# Patient Record
Sex: MALE | Race: WHITE
[De-identification: names, ages, dates, MRNs, and addresses within clinical notes are randomized per-mention and may not be internally consistent; named-entity substitution may affect disease eponyms.]

---

## 2018-11-13 ENCOUNTER — HOSPITAL ENCOUNTER (OUTPATIENT)
Dept: HOSPITAL 61 - PCVCCLINIC | Age: 78
Discharge: HOME | End: 2018-11-13
Attending: INTERNAL MEDICINE
Payer: MEDICARE

## 2018-11-13 DIAGNOSIS — I73.9: ICD-10-CM

## 2018-11-13 DIAGNOSIS — R06.09: Primary | ICD-10-CM

## 2018-11-13 DIAGNOSIS — I25.10: ICD-10-CM

## 2018-11-13 DIAGNOSIS — E78.5: ICD-10-CM

## 2018-11-13 DIAGNOSIS — R42: ICD-10-CM

## 2018-11-13 DIAGNOSIS — E11.9: ICD-10-CM

## 2018-11-13 PROCEDURE — G0463 HOSPITAL OUTPT CLINIC VISIT: HCPCS

## 2018-11-13 PROCEDURE — 93005 ELECTROCARDIOGRAM TRACING: CPT

## 2018-11-30 ENCOUNTER — HOSPITAL ENCOUNTER (OUTPATIENT)
Dept: HOSPITAL 61 - PCVCIMAG | Age: 78
Discharge: HOME | End: 2018-11-30
Attending: INTERNAL MEDICINE
Payer: MEDICARE

## 2018-11-30 DIAGNOSIS — I25.10: ICD-10-CM

## 2018-11-30 DIAGNOSIS — N18.9: ICD-10-CM

## 2018-11-30 DIAGNOSIS — R42: ICD-10-CM

## 2018-11-30 DIAGNOSIS — I08.0: ICD-10-CM

## 2018-11-30 DIAGNOSIS — Z87.891: ICD-10-CM

## 2018-11-30 DIAGNOSIS — I73.9: ICD-10-CM

## 2018-11-30 DIAGNOSIS — E78.5: ICD-10-CM

## 2018-11-30 DIAGNOSIS — I65.21: Primary | ICD-10-CM

## 2018-11-30 PROCEDURE — 93017 CV STRESS TEST TRACING ONLY: CPT

## 2018-11-30 PROCEDURE — 78452 HT MUSCLE IMAGE SPECT MULT: CPT

## 2018-11-30 PROCEDURE — 93880 EXTRACRANIAL BILAT STUDY: CPT

## 2018-11-30 PROCEDURE — A9500 TC99M SESTAMIBI: HCPCS

## 2018-11-30 PROCEDURE — 93306 TTE W/DOPPLER COMPLETE: CPT

## 2018-11-30 NOTE — PCVCIMAG
--------------- APPROVED REPORT --------------





Study performed:  2018 08:29:29



EXAM: Comprehensive 2D, Doppler, and color-flow 

Echocardiogram

Patient Location: Echo lab

Status:  routine



BSA:         2.20

HR: 53 bpmBP:          130/70 mmHg

Rhythm: Bradycardia



Other Information 

Study Quality: Adequate



Indications

Dizziness and Vertigo 

Dyspnea 



2D Dimensions

IVSd:  12.38 (7-11mm)

LVDd:  40.71 mm

PWd:  10.80 (7-11mm)Ascending Ao:  46.78 (22-36mm)

LVDs:  24.50 (25-40mm)

Left Atrium:  41.26 (27-40mm)

Aortic Root:  41.81 mm

LV Single Plane 4CH:  53.12 %

LV Single Plane 2CH:  56.00 %

Biplane EF:  54.5 %



Volumes

Left Atrial Volume (Systole)

Single Plane 4CH:  47.96 mLSingle Plane 2CH:  62.43 mL

LA ESV Index:  28.00 mL/m2



Aortic Valve

AoV Peak Viral.:  1.54 m/s

AO Peak Gr.:  9.45 mmHgLVOT Max P.57 mmHg

LVOT Max V:  1.07 m/s

AI Vmax:  4.32 m/s

AI [place]:  3.03 m/s2

AI PHT:  413.25 ms



Mitral Valve

E/A Ratio:  0.9

MV Decel. Time:  226.73 ms

MV E Max Viral.:  0.81 m/s

MV A Viral.:  0.90 m/s

MV PHT:  65.75 ms

IVRT:  114.19 ms



Pulmonary Valve

PV Peak Viral.:  0.87 m/sPV Peak Gr.:  3.06 mmHg



Pulmonary Vein

P Vein S:    0.25 m/sP Vein A:  0.29 m/s

P Vein D:   0.44 m/sP Vein A Dur.:  155.7 msec

P Vein S/D Ratio:  0.57



Tricuspid Valve

TR Peak Viral.:  2.29 m/s

TR Peak Gr.:  21.03 mmHg



Left Ventricle

The left ventricle is normal size. There is normal LV segmental wall 

motion. Borderline concentric left ventricular hypertrophy. Left 

ventricular systolic function is normal. The left ventricular 

ejection fraction is within the normal range. LVEF is 55%. Grade I - 

abnormal relaxation pattern.



Right Ventricle

The right ventricle is normal size. The right ventricular systolic 

function is normal.



Atria

The left atrium size is normal. The right atrium size is 

normal.



Aortic Valve

The aortic valve is normal in structure. Moderate aortic 

regurgitation. There is no aortic valvular stenosis.



Mitral Valve

The mitral valve is normal in structure. Mild mitral regurgitation. 

No evidence of mitral valve stenosis.



Tricuspid Valve

The tricuspid valve is normal in structure. Trace tricuspid 

regurgitation with PAP of 28 mmHg.



Pulmonic Valve

The pulmonary valve is normal in structure. Trace pulmonic 

regurgitation.



Great Vessels

Aortic root is moderately dilated to 4.5 cm. The ascending aorta is 

moderately dilated to 4.7 cm. IVC is normal in size and collapses 

&gt;50% with inspiration.



Pericardium

There is no pericardial effusion. There is no pleural 

effusion.



&lt;Conclusion&gt;

The left ventricle is normal size.

LVEF is 55%.

The aortic valve is normal in structure.

Moderate aortic regurgitation.

The mitral valve is normal in structure.

Mild mitral regurgitation.

The tricuspid valve is normal in structure.

Trace tricuspid regurgitation with PAP of 28 mmHg.

The pulmonary valve is normal in structure.

Trace pulmonic regurgitation.

## 2018-11-30 NOTE — PCVCIMAG
--------------- APPROVED REPORT --------------





Imaging Protocol: Rest Tc-99m/Stress Tc-99m 1 day

Study performed:  11/30/2018 09:34:51



Indication: CAD , Dyspnea

Patient Location: Out-Patient

Stress Nurse: Aleja Merida RN, Tata Hamlin RN

NM Tech:Giselle [facility]



Ht: 5 ft 10 in Wt: 228 lbs BSA:  2.21 m2

HR: 84 bpm                      BP: 187/81 mmHg         BMI:  

32.71



Medical History

Medical History: CAD, CKD, Hyperlipidemia, Age

Medications: ASA, Metformin, Crestor

Allergies: No known drug allergies

Pretest Chest Pain Characteristics: No chest pain



Resting Data

Rest SPECT myocardial perfusion imaging was performed in supine 

position 45 minutes following the intravenous injection of 11.1 mCi 

of Tc-99m Sestamibi.

Time of rest injection: 0900     Date: 11/30/2018

Administration Route: IV

Administration Site: Right Hand



Exercise Stress

At peak stress, the patient was injected intravenously with 35.9mCi 

of Tc-99m Sestamibi.

Time of stress injection: 1015     Date: 11/30/2018

Administration Route: IV

Administration Site: Right Hand

Patient continued to exercise for 1 minute(s).

Gated Stress SPECT was performed 45 minutes after stress 

injection.

The images were gated to evaluate regional wall motion and calculate 

left ventricular ejection fraction. 



Stress Test Details

Stress Test:  Exercise stress testing was performed using a Saji 

protocol.



HRMax Heart Rate (APMHR): 144 bpm 

Resting HR:            84 bpmTarget HR (85% APMHR): 122 bpm

Max HR Achieved:  142 bpm

% of APMHR:         98

Recovery HR:            98 bpm



BP

Resting BP:  187/81 mmHg

Max BP:       208/90 mmHg

Recovery BP:       157/70 mmHg

ECG

Resting ECG:  Sinus Rhythm

Stress ECG:     Sinus Tachycardia

Arrhythmia:    None

Recovery ECG: Sinus Rhythm



Clinical

Reason for Termination: Dyspnea

Stress Symptoms: Dyspnea

Exercise duration: 9 min 00 sec

Exercise capacity: 10.10 METs

Symptoms resolved during recovery.



Stress ECG Conclusion

1. Subjectively negative for ischemia

2. Electrocardiographically negative for ischemia

3. Satisfactory functional capacity



Study Data

Post stress, the left ventricular ejection was 71%..

SSS: 1

SRS: 3

SDS: 1

TID = 0.72.



Perfusion

There is a medium area of moderately reduced uptake in the entire 

segment of the inferior wall which is seen on the stress images as 

well as the resting images.

This area thickens and moves normally and is most consistent with 

attenuation artifact.



Nuclear Conclusion

ECG Findings: negative for ischemia 

Clinical Findings: negative for ischemia 

Nuclear Findings: negative for ischemia attenuation artifact 

noted

Exercise Capacity: normal

Left Ventricular Function: normal 

1. Low Risk Study 



&lt;Conclusion&gt;

1. Subjectively negative for ischemia

2. Electrocardiographically negative for ischemia

3. Satisfactory functional capacity

## 2018-11-30 NOTE — PCVCIMAG
EXAM: BILATERAL CAROTID DUPLEX



INDICATION: Carotid Occlusive Disease.



FINDINGS: 

Doppler Measurements (centimeters per second):



RIGHT:  Peak CCA-74, Peak ECA-104, Diastolic ICA-27, Peak ICA-113,

ICA/CCA Ratio-1.5.



LEFT:  Peak CCA-96, Peak ECA-103, Diastolic ICA-21, Peak ICA-72,

ICA/CCA Ratio-0.7.



RIGHT CAROTID: The carotid bulb has moderate plaque. The proximal

internal carotid artery shows <40% stenosis. The common carotid artery

shows no significant stenosis. The external carotid artery shows no

significant stenosis.



LEFT CAROTID:  The carotid bulb has no significant plaque. The

proximal internal carotid artery shows no significant stenosis. The

common carotid artery shows no significant stenosis. The external

carotid artery shows no significant stenosis.



Antegrade flow in both vertebral arteries.



IMPRESSION: 

<40% stenosis of the right internal carotid artery with moderate

plaque.

No significant stenosis of the left internal carotid artery with no

significant plaque.



LOC:[place] [id number]

## 2018-12-07 ENCOUNTER — HOSPITAL ENCOUNTER (OUTPATIENT)
Dept: HOSPITAL 35 - GI | Age: 78
Discharge: HOME | End: 2018-12-07
Attending: SPECIALIST
Payer: COMMERCIAL

## 2018-12-07 VITALS — WEIGHT: 220 LBS | HEIGHT: 70 IN | BODY MASS INDEX: 31.5 KG/M2

## 2018-12-07 DIAGNOSIS — K57.30: ICD-10-CM

## 2018-12-07 DIAGNOSIS — K64.8: ICD-10-CM

## 2018-12-07 DIAGNOSIS — K62.1: ICD-10-CM

## 2018-12-07 DIAGNOSIS — Z79.82: ICD-10-CM

## 2018-12-07 DIAGNOSIS — E11.9: ICD-10-CM

## 2018-12-07 DIAGNOSIS — K52.9: ICD-10-CM

## 2018-12-07 DIAGNOSIS — Z96.651: ICD-10-CM

## 2018-12-07 DIAGNOSIS — Z98.890: ICD-10-CM

## 2018-12-07 DIAGNOSIS — Z86.010: ICD-10-CM

## 2018-12-07 DIAGNOSIS — E78.5: ICD-10-CM

## 2018-12-07 DIAGNOSIS — Z79.899: ICD-10-CM

## 2018-12-07 DIAGNOSIS — Z87.891: ICD-10-CM

## 2018-12-07 DIAGNOSIS — D12.2: Primary | ICD-10-CM

## 2018-12-07 PROCEDURE — 62900: CPT

## 2018-12-07 PROCEDURE — 62110: CPT

## 2018-12-18 ENCOUNTER — HOSPITAL ENCOUNTER (OUTPATIENT)
Dept: HOSPITAL 61 - PCVCCLINIC | Age: 78
Discharge: HOME | End: 2018-12-18
Attending: INTERNAL MEDICINE
Payer: MEDICARE

## 2018-12-18 DIAGNOSIS — E78.5: ICD-10-CM

## 2018-12-18 DIAGNOSIS — R42: ICD-10-CM

## 2018-12-18 DIAGNOSIS — Z79.84: ICD-10-CM

## 2018-12-18 DIAGNOSIS — Z79.82: ICD-10-CM

## 2018-12-18 DIAGNOSIS — Z87.891: ICD-10-CM

## 2018-12-18 DIAGNOSIS — E11.9: ICD-10-CM

## 2018-12-18 DIAGNOSIS — R06.09: Primary | ICD-10-CM

## 2018-12-18 PROCEDURE — G0463 HOSPITAL OUTPT CLINIC VISIT: HCPCS
